# Patient Record
Sex: FEMALE | Race: WHITE | NOT HISPANIC OR LATINO
[De-identification: names, ages, dates, MRNs, and addresses within clinical notes are randomized per-mention and may not be internally consistent; named-entity substitution may affect disease eponyms.]

---

## 2017-05-04 ENCOUNTER — APPOINTMENT (OUTPATIENT)
Dept: OTOLARYNGOLOGY | Facility: CLINIC | Age: 37
End: 2017-05-04

## 2018-05-10 ENCOUNTER — EMERGENCY (EMERGENCY)
Facility: HOSPITAL | Age: 38
LOS: 0 days | Discharge: HOME | End: 2018-05-10
Attending: EMERGENCY MEDICINE | Admitting: EMERGENCY MEDICINE
Payer: COMMERCIAL

## 2018-05-10 VITALS
SYSTOLIC BLOOD PRESSURE: 132 MMHG | HEART RATE: 66 BPM | RESPIRATION RATE: 15 BRPM | OXYGEN SATURATION: 100 % | DIASTOLIC BLOOD PRESSURE: 79 MMHG | TEMPERATURE: 98 F

## 2018-05-10 VITALS — WEIGHT: 160.06 LBS | HEIGHT: 66 IN
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
## 2018-05-10 DIAGNOSIS — H54.413A BLINDNESS RIGHT EYE CATEGORY 3, NORMAL VISION LEFT EYE: ICD-10-CM

## 2018-05-10 DIAGNOSIS — S01.81XA LACERATION WITHOUT FOREIGN BODY OF OTHER PART OF HEAD, INITIAL ENCOUNTER: ICD-10-CM

## 2018-05-10 DIAGNOSIS — Z98.89 OTHER SPECIFIED POSTPROCEDURAL STATES: Chronic | ICD-10-CM

## 2018-05-10 DIAGNOSIS — Z98.51 TUBAL LIGATION STATUS: Chronic | ICD-10-CM

## 2018-05-10 DIAGNOSIS — G40.909 EPILEPSY, UNSPECIFIED, NOT INTRACTABLE, WITHOUT STATUS EPILEPTICUS: ICD-10-CM

## 2018-05-10 DIAGNOSIS — D68.62 LUPUS ANTICOAGULANT SYNDROME: ICD-10-CM

## 2018-05-10 PROCEDURE — 99283 EMERGENCY DEPT VISIT LOW MDM: CPT

## 2018-05-10 RX ORDER — ACETAMINOPHEN 500 MG
650 TABLET ORAL ONCE
Qty: 0 | Refills: 0 | Status: COMPLETED | OUTPATIENT
Start: 2018-05-10 | End: 2018-05-10

## 2018-05-10 RX ORDER — TETANUS TOXOID, REDUCED DIPHTHERIA TOXOID AND ACELLULAR PERTUSSIS VACCINE, ADSORBED 5; 2.5; 8; 8; 2.5 [IU]/.5ML; [IU]/.5ML; UG/.5ML; UG/.5ML; UG/.5ML
0.5 SUSPENSION INTRAMUSCULAR ONCE
Qty: 0 | Refills: 0 | Status: COMPLETED | OUTPATIENT
Start: 2018-05-10 | End: 2018-05-10

## 2018-05-10 RX ADMIN — TETANUS TOXOID, REDUCED DIPHTHERIA TOXOID AND ACELLULAR PERTUSSIS VACCINE, ADSORBED 0.5 MILLILITER(S): 5; 2.5; 8; 8; 2.5 SUSPENSION INTRAMUSCULAR at 20:13

## 2018-05-10 RX ADMIN — Medication 650 MILLIGRAM(S): at 20:13

## 2018-05-10 NOTE — ED STATDOCS - SKIN [+], MLM
LACERATION/2.5cm laceration forehead medial aspect left eye LACERATION/2 cm laceration forehead medial aspect left eye

## 2018-05-10 NOTE — ED STATDOCS - ATTENDING CONTRIBUTION TO CARE
I, Shira Damon MD,  performed the initial face to face bedside interview with this patient regarding history of present illness, review of symptoms and relevant past medical, social and family history.  I completed an independent physical examination.  I was the initial provider who evaluated this patient. I have signed out the follow up of any pending tests (i.e. labs, radiological studies) to the ACP.  I have communicated the patient’s plan of care and disposition with the ACP.

## 2018-05-10 NOTE — ED STATDOCS - PROGRESS NOTE DETAILS
Attending Damon, Pt seen initially in split flow by me.  pt s/t trip and fall with laceration near left medial eyebrow.  No LOC.  pt In NAD.  Plan lac repair with Dr. iRcks.

## 2018-05-10 NOTE — ED ADULT TRIAGE NOTE - CHIEF COMPLAINT QUOTE
Patient presents post fall. injury to face meeting Dr Ricks for repair. Denies LOC denies blood thinners

## 2018-05-10 NOTE — ED STATDOCS - OBJECTIVE STATEMENT
37 yr. 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 No LOC  Last Tdap UTD . Meeting dr. Ricks for repair of laceration.

## 2020-09-16 NOTE — ED ADULT TRIAGE NOTE - WEIGHT IN KG
Denita Ventura RN from [facility] called, ph: [phone] called for wound care orders. Per Dr. Parra, Denita should send orders for review and signature. Denita states she will proceed with faxing orders - she has office fax already.   72.6

## 2021-09-21 ENCOUNTER — APPOINTMENT (OUTPATIENT)
Dept: AFTER HOURS CARE | Facility: EMERGENCY ROOM | Age: 41
End: 2021-09-21
Payer: COMMERCIAL

## 2021-09-21 DIAGNOSIS — R60.9 EDEMA, UNSPECIFIED: ICD-10-CM

## 2021-09-21 DIAGNOSIS — R06.02 SHORTNESS OF BREATH: ICD-10-CM

## 2021-09-21 DIAGNOSIS — R14.0 ABDOMINAL DISTENSION (GASEOUS): ICD-10-CM

## 2021-09-21 DIAGNOSIS — R21 RASH AND OTHER NONSPECIFIC SKIN ERUPTION: ICD-10-CM

## 2021-09-21 PROCEDURE — 99204 OFFICE O/P NEW MOD 45 MIN: CPT | Mod: 95

## 2021-09-21 NOTE — PHYSICAL EXAM
[No Acute Distress] : no acute distress [Well Nourished] : well nourished [Well Developed] : well developed [Well-Appearing] : well-appearing [No JVD] : no jugular venous distention [No Respiratory Distress] : no respiratory distress  [No Accessory Muscle Use] : no accessory muscle use [Normal Rate] : normal rate  [Regular Rhythm] : with a regular rhythm [Soft] : abdomen soft [Normal Anterior Cervical Nodes] : no anterior cervical lymphadenopathy [No Joint Swelling] : no joint swelling [Grossly Normal Strength/Tone] : grossly normal strength/tone [Coordination Grossly Intact] : coordination grossly intact [Normal Insight/Judgement] : insight and judgment were intact [Normal Affect] : the affect was normal [de-identified] : see above [de-identified] : see vascular section

## 2021-09-21 NOTE — REVIEW OF SYSTEMS
[Fever] : no fever [Discharge] : no discharge [Earache] : no earache [Chest Pain] : chest pain [Palpitations] : palpitations [Lower Ext Edema] : lower extremity edema [Shortness Of Breath] : shortness of breath [Dyspnea on Exertion] : dyspnea on exertion [Dysuria] : no dysuria [Joint Pain] : joint pain [Itching] : no itching [Skin Rash] : skin rash [Headache] : headache

## 2021-09-21 NOTE — ASSESSMENT
[FreeTextEntry1] : LE pitting edema a/w bloating, jacques, and chest heaviness. Borderline tachycardic on apple watch. Broad ddx in\par context of lupus and significant cardiac hx as well as recent travel/poor diet -- CHF v ARNOLDO v hyponatremia v pericard.\par effusion (less likely) v anemia/thrombocytopenia\par --Recommended pt go to [facility] for labs, ekg, cxr, +/- echo. Patient understood this was going to be the\par likely recommendation and was appreciative of recs. No indication for EMS given her overall reassuring exam. Pt\par stated understanding of importance of going to ED after ending the conversation with me, said she would go to .\[facility] promptly.

## 2021-09-21 NOTE — HISTORY OF PRESENT ILLNESS
[Home] : at home, [unfilled] , at the time of the visit. [Other Location: e.g. Home (Enter Location, City,State)___] : at [unfilled] [Partner] : partner [Verbal consent obtained from patient] : the patient, [unfilled] [FreeTextEntry8] : 2 days LE swelling/pitting edema, bruising, heaviness, "spots"\par a/w HA on and off\par Just got back from a cruise, moderate to heavy etoh intake and poor diet\par UOP the same, face feels more swollen, abd is more swollen/bloated\par a/w chest heaviness and dyspnea on exertion\par Believes symptoms are getting progressively worse\par Cards/rheum at [facility]\par Pt is still urinating\par \par PMH -- seizure d/o, Lupus (arthritis and malar rash) -- on plaquinil and duexis, 1st deg heart block and bradycardia,\par SVT, bicuspid regurg\par PSH -- crainiotomy, c-sections, ppm placement, ablations,

## 2021-10-18 ENCOUNTER — APPOINTMENT (OUTPATIENT)
Dept: AFTER HOURS CARE | Facility: EMERGENCY ROOM | Age: 41
End: 2021-10-18

## 2022-01-18 ENCOUNTER — EMERGENCY (EMERGENCY)
Facility: HOSPITAL | Age: 42
LOS: 1 days | Discharge: HOME | End: 2022-01-18
Attending: EMERGENCY MEDICINE
Payer: COMMERCIAL

## 2022-01-18 VITALS
OXYGEN SATURATION: 99 % | SYSTOLIC BLOOD PRESSURE: 147 MMHG | HEIGHT: 66 IN | TEMPERATURE: 98 F | RESPIRATION RATE: 16 BRPM | DIASTOLIC BLOOD PRESSURE: 110 MMHG | HEART RATE: 87 BPM | WEIGHT: 229.94 LBS

## 2022-01-18 DIAGNOSIS — Z98.89 OTHER SPECIFIED POSTPROCEDURAL STATES: Chronic | ICD-10-CM

## 2022-01-18 DIAGNOSIS — Z98.51 TUBAL LIGATION STATUS: Chronic | ICD-10-CM

## 2022-01-18 PROCEDURE — 99284 EMERGENCY DEPT VISIT MOD MDM: CPT | Mod: 25

## 2022-01-18 PROCEDURE — 96372 THER/PROPH/DIAG INJ SC/IM: CPT | Mod: XU

## 2022-01-18 PROCEDURE — 90715 TDAP VACCINE 7 YRS/> IM: CPT

## 2022-01-18 PROCEDURE — 90471 IMMUNIZATION ADMIN: CPT

## 2022-01-18 PROCEDURE — 99283 EMERGENCY DEPT VISIT LOW MDM: CPT | Mod: 25

## 2022-01-18 PROCEDURE — 73564 X-RAY EXAM KNEE 4 OR MORE: CPT

## 2022-01-18 PROCEDURE — 12001 RPR S/N/AX/GEN/TRNK 2.5CM/<: CPT

## 2022-01-18 PROCEDURE — 73564 X-RAY EXAM KNEE 4 OR MORE: CPT | Mod: 26,RT

## 2022-01-18 RX ORDER — TETANUS TOXOID, REDUCED DIPHTHERIA TOXOID AND ACELLULAR PERTUSSIS VACCINE, ADSORBED 5; 2.5; 8; 8; 2.5 [IU]/.5ML; [IU]/.5ML; UG/.5ML; UG/.5ML; UG/.5ML
0.5 SUSPENSION INTRAMUSCULAR ONCE
Refills: 0 | Status: COMPLETED | OUTPATIENT
Start: 2022-01-18 | End: 2022-01-18

## 2022-01-18 RX ORDER — OXYCODONE AND ACETAMINOPHEN 5; 325 MG/1; MG/1
1 TABLET ORAL ONCE
Refills: 0 | Status: DISCONTINUED | OUTPATIENT
Start: 2022-01-18 | End: 2022-01-18

## 2022-01-18 RX ORDER — HYDROMORPHONE HYDROCHLORIDE 2 MG/ML
1 INJECTION INTRAMUSCULAR; INTRAVENOUS; SUBCUTANEOUS ONCE
Refills: 0 | Status: DISCONTINUED | OUTPATIENT
Start: 2022-01-18 | End: 2022-01-18

## 2022-01-18 RX ADMIN — HYDROMORPHONE HYDROCHLORIDE 1 MILLIGRAM(S): 2 INJECTION INTRAMUSCULAR; INTRAVENOUS; SUBCUTANEOUS at 10:59

## 2022-01-18 RX ADMIN — OXYCODONE AND ACETAMINOPHEN 1 TABLET(S): 5; 325 TABLET ORAL at 10:05

## 2022-01-18 RX ADMIN — TETANUS TOXOID, REDUCED DIPHTHERIA TOXOID AND ACELLULAR PERTUSSIS VACCINE, ADSORBED 0.5 MILLILITER(S): 5; 2.5; 8; 8; 2.5 SUSPENSION INTRAMUSCULAR at 10:03

## 2022-01-18 NOTE — ED ADULT NURSE NOTE - OBJECTIVE STATEMENT
Pt presents s/p fall. Pt states she was taking her daughter to bus stop when she slipped on black ice and fell directly on her right knee. Pt has LAC to knee, bleeding controlled. Pt rates pain 8/10, also complains of back spasms. Pt was able to ambulate after fall to bus stop.

## 2022-01-18 NOTE — ED PROVIDER NOTE - OBJECTIVE STATEMENT
pt is a 42 y/o female presenting to the ed for evaluation. pt stating got surgery on her back 8 weeks ago through [facility] on lower back. pt states slipped on black ice today and fell directly on right knee. pt denies head injury or LOC, is not on blood thinners. pt with pain to the knee with movement and laceration. pt is unsure of tetanus status. pt denies headache neck pain visual changes abd pain nausea vomiting back pain numbness or loss of sensation pt is a 40 y/o female presenting to the ed for evaluation. pt stating got surgery on her back 8 weeks ago [facility] on lower back. pt states slipped on black ice today and fell directly on right knee. pt denies head injury or LOC, is not on blood thinners. pt with pain to the knee with movement and laceration. pt is unsure of tetanus status. pt denies headache neck pain visual changes abd pain nausea vomiting back pain numbness or loss of sensation urinary or fecal incontience. pt states spams in back, due to the fall did not hit her back when she fell, pt has appt with surgeon on friday 42 y/o female presenting to the ed for evaluation. pt stating got surgery on her back 8 weeks ago [facility] on lower back. pt states slipped on black ice today and fell directly on right knee. pt denies head injury or LOC, is not on blood thinners. pt with pain to the knee with movement and laceration. pt is unsure of tetanus status. pt denies headache neck pain visual changes abd pain nausea vomiting back pain numbness or loss of sensation urinary or fecal incontience. pt states spams in back, due to the fall did not hit her back when she fell, pt has appt with surgeon on friday

## 2022-01-18 NOTE — ED PROVIDER NOTE - PATIENT PORTAL LINK FT
You can access the FollowMyHealth Patient Portal offered by [facility] by registering at the following website: [email or web address]. By joining [email or web address]’s FollowMyHealth portal, you will also be able to view your health information using other applications (apps) compatible with our system.

## 2022-01-18 NOTE — ED PROVIDER NOTE - PHYSICAL EXAMINATION
Const: Awake, alert and oriented. In no acute distress. Well appearing.  HEENT: NC/AT. Moist mucous membranes.  Eyes: No scleral icterus. EOMI.  Neck:. Soft and supple. Full ROM without pain.  Cardiac:  +S1/S2. No murmurs. Peripheral pulses 2+ and symmetric. No LE edema.  Resp: Speaking in full sentences. No evidence of respiratory distress. No wheezes, rales or rhonchi.  Abd: Soft, non-tender, non-distended. Normal bowel sounds in all 4 quadrants. No guarding or rebound.  Back: Spine midline and non-tender. no bony step offs or deformities felt on palpation, No CVAT.  MSK: Tenderness over right knee on palpation decreased ROM secondary to pain neurovacsulry intact DP palpable   Skin: laceration to noted to right knee 2.0 cm, no tendon involvement   Lymph: No cervical lymphadenopathy.  Neuro: Awake, alert & oriented x 3. CN II-XII intact, finger to nose intact neurovasculary intact muscle strength fair gait without ataxia reflexes intact

## 2022-01-18 NOTE — ED ADULT NURSE NOTE - BREATH SOUNDS, MLM
Clear Otezla Counseling: The side effects of Otezla were discussed with the patient, including but not limited to worsening or new depression, weight loss, diarrhea, nausea, upper respiratory tract infection, and headache. Patient instructed to call the office should any adverse effect occur.  The patient verbalized understanding of the proper use and possible adverse effects of Otezla.  All the patient's questions and concerns were addressed.

## 2022-01-18 NOTE — ED PROVIDER NOTE - CARE PROVIDER_API CALL
Sterling Quick)  Orthopaedic Surgery  [address] [place] [address]  Phone: [phone]  Fax: [phone]  Follow Up Time:

## 2022-01-18 NOTE — ED PROVIDER NOTE - PROGRESS NOTE DETAILS
reviewed xray, pt to follow up with ortho  wound care explained to patient, signs of infection discussed with patient   copious irrigation

## 2022-01-18 NOTE — ED PROVIDER NOTE - NSFOLLOWUPINSTRUCTIONS_ED_ALL_ED_FT

## 2022-01-18 NOTE — ED PROVIDER NOTE - NSICDXPASTSURGICALHX_GEN_ALL_CORE_FT
PAST SURGICAL HISTORY:  Cardiac Arrhythmia s/p loop monitor placement ,     Epiglottitis age 2    Gastric band erosion     H/O  section 2013 & 2015    H/O tubal ligation 15 months ago    History of nasal surgery b/l turbinectomy 2012    Pacemaker 2013; removed loop monitor  Serial # [id number]; Model # RVDR01    Removal of Lap Band 2011    S/P Reduction Mammoplasty     S/P Tonsillectomy age 11    Status Post Gastric Banding     SVT (Supraventricular Tachycardia) s/p ablation

## 2022-01-25 ENCOUNTER — EMERGENCY (EMERGENCY)
Facility: HOSPITAL | Age: 42
LOS: 1 days | Discharge: HOME | End: 2022-01-25
Attending: EMERGENCY MEDICINE
Payer: COMMERCIAL

## 2022-01-25 VITALS
DIASTOLIC BLOOD PRESSURE: 93 MMHG | SYSTOLIC BLOOD PRESSURE: 138 MMHG | OXYGEN SATURATION: 99 % | HEART RATE: 73 BPM | TEMPERATURE: 99 F | RESPIRATION RATE: 18 BRPM

## 2022-01-25 VITALS
HEART RATE: 86 BPM | TEMPERATURE: 99 F | HEIGHT: 66 IN | SYSTOLIC BLOOD PRESSURE: 149 MMHG | DIASTOLIC BLOOD PRESSURE: 113 MMHG | OXYGEN SATURATION: 100 % | WEIGHT: 229.94 LBS | RESPIRATION RATE: 20 BRPM

## 2022-01-25 DIAGNOSIS — Z98.89 OTHER SPECIFIED POSTPROCEDURAL STATES: Chronic | ICD-10-CM

## 2022-01-25 DIAGNOSIS — Z98.51 TUBAL LIGATION STATUS: Chronic | ICD-10-CM

## 2022-01-25 PROCEDURE — 99283 EMERGENCY DEPT VISIT LOW MDM: CPT | Mod: 25

## 2022-01-25 PROCEDURE — 99283 EMERGENCY DEPT VISIT LOW MDM: CPT

## 2022-01-25 PROCEDURE — 73564 X-RAY EXAM KNEE 4 OR MORE: CPT | Mod: 26,RT

## 2022-01-25 PROCEDURE — 73564 X-RAY EXAM KNEE 4 OR MORE: CPT

## 2022-01-25 RX ORDER — IBUPROFEN 200 MG
600 TABLET ORAL ONCE
Refills: 0 | Status: COMPLETED | OUTPATIENT
Start: 2022-01-25 | End: 2022-01-25

## 2022-01-25 RX ADMIN — Medication 300 MILLIGRAM(S): at 22:08

## 2022-01-25 RX ADMIN — Medication 600 MILLIGRAM(S): at 22:08

## 2022-01-25 NOTE — ED PROVIDER NOTE - OBJECTIVE STATEMENT
pt is a 42 y/o female presenting to the ed for evaluation. pt states she had sutures placed on right knee one week ago. pt states she feels that her knee looks red, swollen. pt states her doctor started her on antibiotics friday keflex. pt denies new injuries or trauma to the area. pt denies cp sob fever abd pain nausea vomiting numbness or loss of sensation headache neck pain visual changes

## 2022-01-25 NOTE — ED PROVIDER NOTE - ATTENDING CONTRIBUTION TO CARE
Wound check 5 days s/p laceration repair. Wound with minimal dehiscence at center of wound, no surrounding erythema/warmth/discharge. No crepitus. XR no gas tracking up wound. Patient instructed to keep sutures in, c/w antibiotics. Sangeeta Up DO

## 2022-01-25 NOTE — ED PROVIDER NOTE - PHYSICAL EXAMINATION
Const: Awake, alert and oriented. In no acute distress. Well appearing.  HEENT: NC/AT. Moist mucous membranes.  Eyes: No scleral icterus. EOMI.  Neck:. Soft and supple. Full ROM without pain.  Cardiac: +S1/S2. No murmurs. Peripheral pulses 2+ and symmetric. No LE edema.  Resp: Speaking in full sentences. No evidence of respiratory distress. No wheezes, rales or rhonchi.  Abd: Soft, non-tender, non-distended. Normal bowel sounds in all 4 quadrants. No guarding or rebound.  Back: Spine midline and non-tender. No CVAT.  MSK: FROM in all extremities neurovasuclary intact DP palpable   Skin: Wound with minimal dehiscence at center of wound, no surrounding erythema/warmth/discharge, sutures in place noted no linear streaking noted   Lymph: No cervical lymphadenopathy.  Neuro: Awake, alert & oriented x 3. Moves all extremities symmetrically.

## 2022-01-25 NOTE — ED PROVIDER NOTE - CLINICAL SUMMARY MEDICAL DECISION MAKING FREE TEXT BOX
obtain xray, antibiotics   pt to follow up with primary care doctor for wound check  return to the emergency department for any worsening symptoms

## 2022-01-25 NOTE — ED ADULT TRIAGE NOTE - CHIEF COMPLAINT QUOTE
Patient arrived to ED today with c/o wound check after sutures where placed a week ago to her right knee she states area now look red, swollen and infected.

## 2022-01-25 NOTE — ED PROVIDER NOTE - PATIENT PORTAL LINK FT
You can access the FollowMyHealth Patient Portal offered by [facility] by registering at the following website: [email or web address]. By joining [employer]’s FollowMyHealth portal, you will also be able to view your health information using other applications (apps) compatible with our system.

## 2022-01-25 NOTE — ED ADULT NURSE NOTE - OBJECTIVE STATEMENT
Patient presents to the ED for right knee pain.  Patient was seen in ED and has sutures placed about x1 week ago.  patient reports that wound looks red abd swollen.  Patient reports that she was seen by her MD and he prescibed keflex that she has been taking since friday.  Pt denies fevers and chills.

## 2022-12-27 ENCOUNTER — OFFICE (OUTPATIENT)
Dept: URBAN - METROPOLITAN AREA CLINIC 6 | Facility: CLINIC | Age: 42
Setting detail: OPHTHALMOLOGY
End: 2022-12-27
Payer: COMMERCIAL

## 2022-12-27 DIAGNOSIS — H16.223: ICD-10-CM

## 2022-12-27 DIAGNOSIS — H50.111: ICD-10-CM

## 2022-12-27 DIAGNOSIS — Z79.899: ICD-10-CM

## 2022-12-27 DIAGNOSIS — H46.2: ICD-10-CM

## 2022-12-27 DIAGNOSIS — H25.13: ICD-10-CM

## 2022-12-27 PROCEDURE — 92083 EXTENDED VISUAL FIELD XM: CPT | Performed by: OPHTHALMOLOGY

## 2022-12-27 PROCEDURE — 92250 FUNDUS PHOTOGRAPHY W/I&R: CPT | Performed by: OPHTHALMOLOGY

## 2022-12-27 PROCEDURE — 92014 COMPRE OPH EXAM EST PT 1/>: CPT | Performed by: OPHTHALMOLOGY

## 2022-12-27 ASSESSMENT — KERATOMETRY
OD_K1POWER_DIOPTERS: 41.50
OS_K2POWER_DIOPTERS: 44.00
OS_AXISANGLE_DEGREES: 078
OD_K2POWER_DIOPTERS: 43.75
METHOD_AUTO_MANUAL: AUTO
OD_AXISANGLE_DEGREES: 123
OS_K1POWER_DIOPTERS: 43.00

## 2022-12-27 ASSESSMENT — SPHEQUIV_DERIVED
OS_SPHEQUIV: -0.5
OD_SPHEQUIV: -0.125
OS_SPHEQUIV: -0.375
OS_SPHEQUIV: -0.5

## 2022-12-27 ASSESSMENT — AXIALLENGTH_DERIVED
OD_AL: 23.9679
OS_AL: 23.7389
OS_AL: 23.7883
OS_AL: 23.7883

## 2022-12-27 ASSESSMENT — REFRACTION_CURRENTRX
OD_SPHERE: PLANO
OD_OVR_VA: 20/
OD_AXIS: 055
OS_VPRISM_DIRECTION: SV
OS_AXIS: 53
OD_CYLINDER: -0.50
OS_SPHERE: PLANO
OS_OVR_VA: 20/
OD_OVR_VA: 20/
OD_SPHERE: +0.25
OS_CYLINDER: -0.50
OS_CYLINDER: +0.50
OS_OVR_VA: 20/
OS_AXIS: 128
OS_VPRISM_DIRECTION: SV
OS_SPHERE: -0.50
OD_VPRISM_DIRECTION: SV

## 2022-12-27 ASSESSMENT — REFRACTION_MANIFEST
OS_VA1: 20/20-1
OS_CYLINDER: -0.50
OS_AXIS: 135
OS_CYLINDER: +0.25
OD_SPHERE: BALANCE
OS_AXIS: 40
OS_VA1: 20/20
OD_VA1: NLP
OS_ADD: +1.00
OD_VA1: 20/LP
OU_VA: 20/20
OS_SPHERE: -0.50
OS_SPHERE: -0.25

## 2022-12-27 ASSESSMENT — TONOMETRY
OD_IOP_MMHG: 20
OS_IOP_MMHG: 20

## 2022-12-27 ASSESSMENT — REFRACTION_AUTOREFRACTION
OD_AXIS: 042
OS_CYLINDER: -0.50
OD_CYLINDER: -1.25
OS_SPHERE: -0.25
OS_AXIS: 139
OD_SPHERE: +0.50

## 2022-12-27 ASSESSMENT — SUPERFICIAL PUNCTATE KERATITIS (SPK)
OD_SPK: T
OS_SPK: T

## 2022-12-27 ASSESSMENT — VISUAL ACUITY
OD_BCVA: 20/30
OS_BCVA: NLP

## 2022-12-27 ASSESSMENT — CONFRONTATIONAL VISUAL FIELD TEST (CVF)
OS_FINDINGS: FULL
OD_FINDINGS: FULL

## 2023-03-23 ENCOUNTER — APPOINTMENT (OUTPATIENT)
Dept: ORTHOPEDIC SURGERY | Facility: CLINIC | Age: 43
End: 2023-03-23

## 2023-03-24 PROBLEM — H35.033 HYPERTENSIVE RETINOPATHY; BOTH EYES: Status: ACTIVE | Noted: 2023-03-24

## 2023-03-24 PROBLEM — H43.22 ASTEROID HYALOSIS; LEFT EYE: Status: ACTIVE | Noted: 2023-03-24

## 2023-05-08 ENCOUNTER — NON-APPOINTMENT (OUTPATIENT)
Age: 43
End: 2023-05-08

## 2023-05-14 ENCOUNTER — NON-APPOINTMENT (OUTPATIENT)
Age: 43
End: 2023-05-14

## 2023-09-25 ENCOUNTER — OFFICE (OUTPATIENT)
Dept: URBAN - METROPOLITAN AREA CLINIC 63 | Facility: CLINIC | Age: 43
Setting detail: OPHTHALMOLOGY
End: 2023-09-25
Payer: COMMERCIAL

## 2023-09-25 ENCOUNTER — RX ONLY (RX ONLY)
Age: 43
End: 2023-09-25

## 2023-09-25 DIAGNOSIS — H46.2: ICD-10-CM

## 2023-09-25 PROCEDURE — 92250 FUNDUS PHOTOGRAPHY W/I&R: CPT | Performed by: STUDENT IN AN ORGANIZED HEALTH CARE EDUCATION/TRAINING PROGRAM

## 2023-09-25 PROCEDURE — 92012 INTRM OPH EXAM EST PATIENT: CPT | Performed by: STUDENT IN AN ORGANIZED HEALTH CARE EDUCATION/TRAINING PROGRAM

## 2023-09-25 RX ORDER — PREDNISOLONE ACETATE 10 MG/ML
SUSPENSION/ DROPS OPHTHALMIC
Qty: 1 | Refills: 0 | Status: ACTIVE | OUTPATIENT

## 2023-09-25 RX ORDER — DORZOLAMIDE HYDROCHLORIDE AND TIMOLOL MALEATE 20; 5 MG/ML; MG/ML
SOLUTION OPHTHALMIC
Qty: 1 | Refills: 1 | Status: ACTIVE | OUTPATIENT

## 2023-09-25 ASSESSMENT — CONFRONTATIONAL VISUAL FIELD TEST (CVF)
OS_FINDINGS: FULL
OD_FINDINGS: FULL

## 2023-09-25 ASSESSMENT — TONOMETRY
OD_IOP_MMHG: 21
OS_IOP_MMHG: 21

## 2023-09-25 ASSESSMENT — SUPERFICIAL PUNCTATE KERATITIS (SPK)
OD_SPK: T
OS_SPK: T

## 2023-09-26 ASSESSMENT — SPHEQUIV_DERIVED
OS_SPHEQUIV: -0.125
OS_SPHEQUIV: -0.375
OD_SPHEQUIV: 0.5
OS_SPHEQUIV: -0.5

## 2023-09-26 ASSESSMENT — REFRACTION_CURRENTRX
OD_OVR_VA: 20/
OS_SPHERE: PLANO
OS_CYLINDER: +0.50
OD_CYLINDER: -0.50
OS_SPHERE: -0.50
OS_OVR_VA: 20/
OD_VPRISM_DIRECTION: SV
OD_OVR_VA: 20/
OD_SPHERE: +0.25
OS_AXIS: 128
OS_OVR_VA: 20/
OS_AXIS: 53
OS_CYLINDER: -0.50
OD_AXIS: 055
OS_VPRISM_DIRECTION: SV
OD_SPHERE: PLANO
OS_VPRISM_DIRECTION: SV

## 2023-09-26 ASSESSMENT — REFRACTION_MANIFEST
OS_SPHERE: -0.25
OD_VA1: NLP
OS_AXIS: 40
OS_SPHERE: -0.50
OD_SPHERE: BALANCE
OD_VA1: 20/LP
OS_ADD: +1.00
OS_CYLINDER: +0.25
OU_VA: 20/20
OS_VA1: 20/20
OS_AXIS: 135
OS_CYLINDER: -0.50
OS_VA1: 20/20-1

## 2023-09-26 ASSESSMENT — KERATOMETRY
OS_AXISANGLE_DEGREES: 078
OD_AXISANGLE_DEGREES: 123
OS_K1POWER_DIOPTERS: 43.00
METHOD_AUTO_MANUAL: AUTO
OS_K2POWER_DIOPTERS: 44.00
OD_K2POWER_DIOPTERS: 43.75
OD_K1POWER_DIOPTERS: 41.50

## 2023-09-26 ASSESSMENT — AXIALLENGTH_DERIVED
OS_AL: 23.6407
OS_AL: 23.7883
OS_AL: 23.7389
OD_AL: 23.7192

## 2023-09-26 ASSESSMENT — REFRACTION_AUTOREFRACTION
OD_AXIS: 035
OD_SPHERE: +1.00
OS_SPHERE: 0.00
OD_CYLINDER: -1.00
OS_AXIS: 112
OS_CYLINDER: -0.25

## 2023-09-26 ASSESSMENT — VISUAL ACUITY
OS_BCVA: NLP
OD_BCVA: 20/30

## 2023-09-28 ENCOUNTER — OFFICE (OUTPATIENT)
Dept: URBAN - METROPOLITAN AREA CLINIC 115 | Facility: CLINIC | Age: 43
Setting detail: OPHTHALMOLOGY
End: 2023-09-28
Payer: COMMERCIAL

## 2023-09-28 DIAGNOSIS — H46.2: ICD-10-CM

## 2023-09-28 DIAGNOSIS — H35.413: ICD-10-CM

## 2023-09-28 PROCEDURE — 92134 CPTRZ OPH DX IMG PST SGM RTA: CPT | Performed by: OPHTHALMOLOGY

## 2023-09-28 PROCEDURE — 92012 INTRM OPH EXAM EST PATIENT: CPT | Performed by: OPHTHALMOLOGY

## 2023-09-28 PROCEDURE — 67145 PROPH RTA DTCHMNT PC: CPT | Performed by: OPHTHALMOLOGY

## 2023-09-28 ASSESSMENT — SPHEQUIV_DERIVED
OS_SPHEQUIV: -0.125
OD_SPHEQUIV: 0.5

## 2023-09-28 ASSESSMENT — CONFRONTATIONAL VISUAL FIELD TEST (CVF)
OS_FINDINGS: FULL
OD_FINDINGS: FULL

## 2023-09-28 ASSESSMENT — AXIALLENGTH_DERIVED
OD_AL: 23.7192
OS_AL: 23.6407

## 2023-09-28 ASSESSMENT — REFRACTION_CURRENTRX
OD_OVR_VA: 20/
OS_OVR_VA: 20/
OS_VPRISM_DIRECTION: SV
OD_SPHERE: PLANO
OD_AXIS: 055
OS_AXIS: 128
OS_SPHERE: PLANO
OS_AXIS: 53
OD_CYLINDER: -0.50
OD_VPRISM_DIRECTION: SV
OD_OVR_VA: 20/
OS_SPHERE: -0.50
OS_CYLINDER: +0.50
OS_OVR_VA: 20/
OD_SPHERE: +0.25
OS_CYLINDER: -0.50
OS_VPRISM_DIRECTION: SV

## 2023-09-28 ASSESSMENT — KERATOMETRY
OD_K1POWER_DIOPTERS: 41.50
METHOD_AUTO_MANUAL: AUTO
OS_K1POWER_DIOPTERS: 43.00
OD_K2POWER_DIOPTERS: 43.75
OS_K2POWER_DIOPTERS: 44.00
OD_AXISANGLE_DEGREES: 123
OS_AXISANGLE_DEGREES: 078

## 2023-09-28 ASSESSMENT — SUPERFICIAL PUNCTATE KERATITIS (SPK)
OS_SPK: T
OD_SPK: T

## 2023-09-28 ASSESSMENT — REFRACTION_AUTOREFRACTION
OS_CYLINDER: -0.25
OS_SPHERE: 0.00
OD_SPHERE: +1.00
OD_AXIS: 035
OD_CYLINDER: -1.00
OS_AXIS: 112

## 2023-09-28 ASSESSMENT — TONOMETRY: OS_IOP_MMHG: 19

## 2023-09-28 ASSESSMENT — VISUAL ACUITY
OS_BCVA: NLP
OD_BCVA: 20/30

## 2024-03-20 ENCOUNTER — OFFICE (OUTPATIENT)
Dept: URBAN - METROPOLITAN AREA CLINIC 6 | Facility: CLINIC | Age: 44
Setting detail: OPHTHALMOLOGY
End: 2024-03-20
Payer: COMMERCIAL

## 2024-03-20 VITALS — HEIGHT: 55 IN

## 2024-03-20 DIAGNOSIS — H35.413: ICD-10-CM

## 2024-03-20 DIAGNOSIS — H25.13: ICD-10-CM

## 2024-03-20 DIAGNOSIS — H46.2: ICD-10-CM

## 2024-03-20 DIAGNOSIS — H52.13: ICD-10-CM

## 2024-03-20 DIAGNOSIS — H16.223: ICD-10-CM

## 2024-03-20 DIAGNOSIS — H50.111: ICD-10-CM

## 2024-03-20 DIAGNOSIS — Z79.899: ICD-10-CM

## 2024-03-20 DIAGNOSIS — L93.0: ICD-10-CM

## 2024-03-20 PROCEDURE — 92015 DETERMINE REFRACTIVE STATE: CPT | Performed by: OPHTHALMOLOGY

## 2024-03-20 PROCEDURE — 92083 EXTENDED VISUAL FIELD XM: CPT | Performed by: OPHTHALMOLOGY

## 2024-03-20 PROCEDURE — 92014 COMPRE OPH EXAM EST PT 1/>: CPT | Performed by: OPHTHALMOLOGY

## 2024-03-20 PROCEDURE — 92250 FUNDUS PHOTOGRAPHY W/I&R: CPT | Performed by: OPHTHALMOLOGY

## 2024-03-20 ASSESSMENT — SPHEQUIV_DERIVED: OS_SPHEQUIV: 0

## 2024-03-20 ASSESSMENT — REFRACTION_CURRENTRX
OS_OVR_VA: 20/
OD_VPRISM_DIRECTION: SV
OS_CYLINDER: +0.50
OS_AXIS: 131
OS_VPRISM_DIRECTION: SV
OS_AXIS: 53
OD_OVR_VA: 20/
OD_OVR_VA: 20/
OD_SPHERE: PLANO
OS_SPHERE: -0.25
OD_SPHERE: PLANO
OD_AXIS: 046
OS_VPRISM_DIRECTION: SV
OS_CYLINDER: -0.50
OD_CYLINDER: -0.50
OS_SPHERE: -0.50
OS_OVR_VA: 20/

## 2024-03-20 ASSESSMENT — REFRACTION_MANIFEST
OS_SPHERE: +0.25
OS_ADD: +0.75
OS_VA1: 20/20-
OD_SPHERE: BALANCE
OS_AXIS: 145
OS_CYLINDER: -0.50

## 2025-01-21 NOTE — ED PROCEDURE NOTE - CPROC ED LACERATION CLEANSED1
Airway    Performed by: Cheryl Boone CRNA  Authorized by: Eric Cruz MD    Final Airway Type:  Endotracheal airway  Final Endotracheal Airway*:  ETT  ETT Size (mm)*:  7.5  Cuff*:  Regular  Technique Used for Successful ETT Placement:  Direct laryngoscopy  Devices/Methods Used in Placement*:  Oral ETT, Stylet and Mask  Intubation Procedure*:  Dentition Unchanged, Atraumatic, Preoxygenation, Phaynx Clear and ETCO2  Blade Type*:  MAC  Blade Size*:  4  Measured from*:  Lips  Secured at (cm)*:  21  Placement Verified by: auscultation, capnometry and equal breath sounds    Glottic View*:  1 - full view of glottis  Attempts*:  1  Number of Other Approaches Attempted:  0   Patient Identified, Procedure confirmed, Emergency equipment available and Safety protocols followed  Location:  OR  Urgency:  Elective  Difficult Airway: No    Indications for Airway Management:  Anesthesia  Sedation Level:  Anesthetized  Mask Difficulty Assessment:  2 - vent by mask + OA or adjuvant +/- NMBA  Start Time: 1/21/2025 7:49 AM       cleansed

## 2025-06-24 ENCOUNTER — OFFICE (OUTPATIENT)
Dept: URBAN - METROPOLITAN AREA CLINIC 6 | Facility: CLINIC | Age: 45
Setting detail: OPHTHALMOLOGY
End: 2025-06-24
Payer: COMMERCIAL

## 2025-06-24 ENCOUNTER — RX ONLY (RX ONLY)
Age: 45
End: 2025-06-24

## 2025-06-24 DIAGNOSIS — Z79.899: ICD-10-CM

## 2025-06-24 DIAGNOSIS — H52.4: ICD-10-CM

## 2025-06-24 DIAGNOSIS — H35.413: ICD-10-CM

## 2025-06-24 DIAGNOSIS — H16.223: ICD-10-CM

## 2025-06-24 DIAGNOSIS — L93.0: ICD-10-CM

## 2025-06-24 DIAGNOSIS — H25.13: ICD-10-CM

## 2025-06-24 DIAGNOSIS — H50.111: ICD-10-CM

## 2025-06-24 DIAGNOSIS — H46.2: ICD-10-CM

## 2025-06-24 PROCEDURE — 92250 FUNDUS PHOTOGRAPHY W/I&R: CPT | Performed by: OPHTHALMOLOGY

## 2025-06-24 PROCEDURE — 92015 DETERMINE REFRACTIVE STATE: CPT | Performed by: OPHTHALMOLOGY

## 2025-06-24 PROCEDURE — 92083 EXTENDED VISUAL FIELD XM: CPT | Performed by: OPHTHALMOLOGY

## 2025-06-24 PROCEDURE — 92014 COMPRE OPH EXAM EST PT 1/>: CPT | Performed by: OPHTHALMOLOGY

## 2025-06-24 ASSESSMENT — REFRACTION_CURRENTRX
OD_SPHERE: BALANCE
OS_OVR_VA: 20/
OS_AXIS: 53
OD_OVR_VA: 20/
OS_AXIS: 145
OD_OVR_VA: 20/
OD_SPHERE: PLANO
OS_VPRISM_DIRECTION: SV
OS_SPHERE: -0.50
OS_CYLINDER: +0.50
OS_VPRISM_DIRECTION: SV
OS_SPHERE: +0.25
OD_VPRISM_DIRECTION: SV
OS_CYLINDER: -0.50
OS_OVR_VA: 20/

## 2025-06-24 ASSESSMENT — CONFRONTATIONAL VISUAL FIELD TEST (CVF)
OS_FINDINGS: FULL
OD_FINDINGS: FULL

## 2025-06-24 ASSESSMENT — REFRACTION_MANIFEST
OS_AXIS: 150
OS_CYLINDER: -0.75
OS_SPHERE: PLANO
OS_VA1: 20/25-
OS_ADD: +0.75
OD_SPHERE: BALANCE

## 2025-06-24 ASSESSMENT — VISUAL ACUITY
OD_BCVA: 20/30-1
OS_BCVA: NLP

## 2025-06-24 ASSESSMENT — TONOMETRY
OD_IOP_MMHG: 21
OS_IOP_MMHG: 18

## 2025-06-24 ASSESSMENT — KERATOMETRY: METHOD_AUTO_MANUAL: AUTO

## 2025-06-24 ASSESSMENT — SUPERFICIAL PUNCTATE KERATITIS (SPK)
OD_SPK: T
OS_SPK: T